# Patient Record
Sex: FEMALE | Race: WHITE
[De-identification: names, ages, dates, MRNs, and addresses within clinical notes are randomized per-mention and may not be internally consistent; named-entity substitution may affect disease eponyms.]

---

## 2019-09-06 ENCOUNTER — HOSPITAL ENCOUNTER (OUTPATIENT)
Dept: HOSPITAL 46 - DS | Age: 79
Discharge: HOME | End: 2019-09-06
Attending: SURGERY
Payer: MEDICARE

## 2019-09-06 VITALS — HEIGHT: 66 IN | BODY MASS INDEX: 27.48 KG/M2 | WEIGHT: 170.99 LBS

## 2019-09-06 DIAGNOSIS — Z79.899: ICD-10-CM

## 2019-09-06 DIAGNOSIS — F03.90: ICD-10-CM

## 2019-09-06 DIAGNOSIS — F32.9: ICD-10-CM

## 2019-09-06 DIAGNOSIS — Z79.01: ICD-10-CM

## 2019-09-06 DIAGNOSIS — E78.5: ICD-10-CM

## 2019-09-06 DIAGNOSIS — I10: ICD-10-CM

## 2019-09-06 DIAGNOSIS — D12.6: ICD-10-CM

## 2019-09-06 DIAGNOSIS — K64.8: ICD-10-CM

## 2019-09-06 DIAGNOSIS — I63.9: ICD-10-CM

## 2019-09-06 DIAGNOSIS — K64.4: ICD-10-CM

## 2019-09-06 DIAGNOSIS — K52.9: ICD-10-CM

## 2019-09-06 DIAGNOSIS — R06.02: ICD-10-CM

## 2019-09-06 DIAGNOSIS — F41.9: ICD-10-CM

## 2019-09-06 DIAGNOSIS — Z98.890: ICD-10-CM

## 2019-09-06 DIAGNOSIS — D12.3: Primary | ICD-10-CM

## 2019-09-06 PROCEDURE — 3E0H8GC INTRODUCTION OF OTHER THERAPEUTIC SUBSTANCE INTO LOWER GI, VIA NATURAL OR ARTIFICIAL OPENING ENDOSCOPIC: ICD-10-PCS | Performed by: SURGERY

## 2019-09-06 PROCEDURE — 0DBL8ZZ EXCISION OF TRANSVERSE COLON, VIA NATURAL OR ARTIFICIAL OPENING ENDOSCOPIC: ICD-10-PCS | Performed by: SURGERY

## 2019-09-06 PROCEDURE — 0DBE8ZZ EXCISION OF LARGE INTESTINE, VIA NATURAL OR ARTIFICIAL OPENING ENDOSCOPIC: ICD-10-PCS | Performed by: SURGERY

## 2019-09-06 NOTE — NUR
09/06/19 0809 Yudi Rodriguez
0802- PT ARRIVES TO PACU NONAROUSABLE TO NOXIOUS STIMULI. RESP EVEN
AND UNLABORED. OXYGEN SAT HIGH 90'S % ON 6L VIA MASK.

## 2019-09-08 NOTE — OR
[facility]
                                    [address], [place]  [address]
_________________________________________________________________________________________
                                                                 Signed   
 
 
DATE OF OPERATION:
09/06/2019
 
SURGEON:
José Antonio Sosa MD
 
PREOPERATIVE DIAGNOSES:
1. Chronic diarrhea.
2. Son with colon cancer in his 50s.
 
POSTOPERATIVE DIAGNOSES:
1. 7 mm sessile polyp in proximal transverse colon (tattoo).
2. 5 mm polyp at 45 and 15 cm.
3. Minimal-to-moderate internal and external hemorrhoids.
 
PROCEDURE:
Colonoscopy with hot biopsy and random cold biopsies as well as injection of tattoo.
 
ESTIMATED BLOOD LOSS:
None.
 
INDICATIONS:
Johnna is a 79-year-old female who had a previous stroke.  She has had trouble with
chronic diarrhea.  However, seems worse recently.  Consequently, this represents a
change in bowel habits.  She has been trying to use loperamide to control the diarrhea.
She had been asked to see me for colonoscopy with biopsies.  She thinks maybe she had a
colonoscopy many years ago.  She also explained that her son had colon cancer in his
50s.  He seems to be doing fine now.  She had come to the office with her caregiver.  I
gave them a pamphlet on colonoscopy.  We looked at that together along with the risks
including, but not limited to gas, bloating, crampy abdominal pain, bleeding,
perforation requiring surgery, and missed diagnosis.  We also discussed the need for IV
conscious sedation.  Given Johnna's advanced age and medical issues, we asked that an
anesthesia provider help us with increased monitoring and sedation with propofol.  They
had expressed understanding and wished to proceed. 
 
PROCEDURE NOTE:
Johnna was taken into our endoscopy suite and placed in the left lateral decubitus
position.  She was given IV sedation with propofol per our nurse anesthetist.  A digital
rectal exam was performed and she does have minimal-to-moderate external hemorrhoids.
The adult colonoscope was introduced and advanced all around into the cecum under direct
visualization of camera without difficulty.  Her prep was quite good.  We could easily
see the appendiceal orifice and the ileocecal valve.  We had taken pictures throughout
 
    Electronically Signed By: JOSÉ ANTONIO SOSA MD  09/08/19 1032
_________________________________________________________________________________________
PATIENT NAME:     JOHNNA JAEGER                      
MEDICAL RECORD #: [id number]            OPERATIVE REPORT              
          ACCT #: [id number]  
DATE OF BIRTH:   [birth year]            REPORT #: [id number]      
PHYSICIAN:        JOSÉ ANTONIO SOSA MD             
PCP:              TARIQ ADRIAN MD      
REPORT IS CONFIDENTIAL AND NOT TO BE RELEASED WITHOUT AUTHORIZATION
 
 
                                  [facility]
                                    [address] [place] [address]  [address]
_________________________________________________________________________________________
                                                                 Signed   
 
 
for photodocumentation.  The scope was slowly withdrawn.  We made several attempts to
pass the scope into the terminal ileum without success.  We had taken several biopsies
throughout the colon for pathologic review.  The above-mentioned polyps were easily
removed with the help of hot biopsy forceps.  We did inject a small tattoo next to the
polypectomy site in the proximal transverse colon.  We saw no diverticula.  No
inflammatory changes throughout the colon or rectum.  Upon retroflexion of the scope,
there were minimal-to-moderate internal hemorrhoids as well.  After this, the gas was
suctioned out and the colonoscope removed.  Johnna tolerated the procedure quite well. 
 
RECOMMENDATIONS:
I will see Johnna back in my office in 7 to 14 days to review her results.  She can resume
her Coumadin in 1 week.  She should not take any aspirin or NSAIDs for one week.  Her
other chronic medications, she can resume today. 
 
 
 
            ________________________________________
            José Antonio Sosa MD 
 
 
ALB/MODL
Job #:  [id number]
DD:  09/06/2019 08:14:28
DT:  09/06/2019 09:44:40
 
cc:            MD José Antonio Navarrete MD Daniel C Hambleton, MD
 
 
Copies:  MARLYS GALLARDO MD,HERBERTH SOSA,JA GONZALEZ MD, MD
~
 
 
 
 
    Electronically Signed By: JOSÉ ANTONIO SOSA MD  09/08/19 1032
_________________________________________________________________________________________
PATIENT NAME:     JOHNNA JAEGER                      
MEDICAL RECORD #: [id number]            OPERATIVE REPORT              
          ACCT #: [id number]  
DATE OF BIRTH:   [birth year]            REPORT #: [id number]      
PHYSICIAN:        JOSÉ ANTONIO SOSA MD             
PCP:              TARIQ ADRIAN MD      
REPORT IS CONFIDENTIAL AND NOT TO BE RELEASED WITHOUT AUTHORIZATION

## 2019-09-09 NOTE — PATH
[facility]
                                    [address] [place], [place]  [address]
_________________________________________________________________________________________
                                                                 Signed   
 
 
 
SPECIMEN(S): A COLON POLYP AT 45 CM
SPECIMEN(S): B RANDOM COLON BIOPSY
SPECIMEN(S): C PROXIMAL TRANSVERSE COLON BIOPSY
SPECIMEN(S): D COLON POLYP AT 15 CM
 
SPECIMEN SOURCE:
A. COLON POLYP AT 45 CM
B. RANDOM COLON BIOPSY
C. PROXIMAL TRANSVERSE COLON BIOPSY
D. COLON POLYP AT 15 CM
 
CLINICAL HISTORY:
Hx diarrhea.
MICROSCOPIC DESCRIPTION:
A, C, D.  Histologic sections of all submitted blocks are examined by light 
microscopy. These findings, together with the gross examination, support the 
pathologic diagnosis. 
B.  Sections reveal a biopsy of colonic mucosa.  The epithelial surface is 
intact but has decreased mucus secreting ability.  The basement membrane is not 
thickened.  The glands are simple and tubular 
and reach all of the way to the muscularis mucosae.  The lamina propria is 
minimally expanded by a population of plasma cells, lymphocytes, eosinophils 
and small numbers of neutrophils.  One of the 
neutrophils extend into the mucosa proper.  In a couple of areas there are 
excess intraepithelial lymphocytes.  No ulceration, crypt abscesses, areas of 
fibrosis, granulomas or pseudomembranes are 
seen.
LJA:cml
 
FINAL PATHOLOGIC DIAGNOSIS:
A.  Mucosa, colon at 45 cm, biopsy:
-  Tubular adenoma.
B.  Mucosa, colon, random biopsies:
-  Mild active chronic colitis.  (see comment)
C.  Mucosa, proximal transverse colon, biopsy:
-  Tubular adenoma with inflammatory features similar to that described above 
in B. 
D.  Mucosa, colon at 15 cm, biopsy:
-  Tubular adenoma.
 
COMMENT:
 
                                                                                    
_________________________________________________________________________________________
PATIENT NAME:     JOHNNA JAEGER                      
MEDICAL RECORD #: [id number]            PATHOLOGY                     
          ACCT #: [id number]       ACCESSION #: [id number]     
DATE OF BIRTH:   [birth year]            REPORT #: [id number]       
PHYSICIAN:        SHAISTA CHEUNG              
PCP:              TARIQ ADRIAN MD      
REPORT IS CONFIDENTIAL AND NOT TO BE RELEASED WITHOUT AUTHORIZATION
 
 
                                  [facility]
                                    [address] [place], [place]  [address]
_________________________________________________________________________________________
                                                                 Signed   
 
 
B -- The differential diagnosis includes both infectious and noninfectious 
causes of colitis, including microscopic colitis.  Features are not consistent 
with Crohn's colitis or ulcerative colitis. 
Clinical correlation required.
LJA:cml:C2NR
 
GROSS DESCRIPTION:
Four specimens are received in four containers, labeled "GALILEA."
A. The specimen, labeled "GALILEA, colon polyp at 45 cm," is received in formalin 
and consists of two, 0.3 and 0.4 cm tan fragments. Specimen is entirely 
submitted in cassette (A1). 
B. The specimen, labeled "GALILEA, random colon biopsy," is received in formalin 
and consists of four, 0.1-0.2 cm tan fragments. Specimen is entirely submitted 
in cassette (B1). 
C.  The specimen, labeled "GALILEA, proximal transverse colon biopsy," is received 
in formalin and consists of five, 0.2-0.3 cm tan-brown fragments. Specimen is 
entirely submitted in cassette (C1). 
D.  The specimen, labeled "GALILEA, colon polyp at 15 cm," is received in formalin 
and consists of a single 0.3 cm tan fragment. Specimen is entirely submitted in 
cassette (D1). 
AM (under the direct supervision of a pathologist)
 
The Gross Description was prepared using a voice recognition system.  The 
report was reviewed for accuracy; however, sound-alike word errors, addition 
and/or deletions may occur.  If there is any 
question about this report, please contact Client Services.
 
PERFORMING LABORATORY:
The technical component was performed by [facility], [address] [place] [id number] (Medical Director: Janine Mojica MD; CLIA# [id number]). 
Professional interpretation was performed by 
[facility], [address] [address] (Medical Director:  Jaydon Warner MD; CLIA# 
[id number]). 
 
Diagnostician:  Jaydon Warner MD
Pathologist
Electronically Signed 09/09/2019
 
 
Copies:                                
 
 
                                                                                    
_________________________________________________________________________________________
PATIENT NAME:     JOHNNA JAEGER                      
MEDICAL RECORD #: [id number]            PATHOLOGY                     
          ACCT #: [id number]       ACCESSION #: [id number]     
DATE OF BIRTH:   [birth year]            REPORT #: [id number]       
PHYSICIAN:        SHAISTA PATHOLOGY              
PCP:              TARIQ ADRIAN MD      
REPORT IS CONFIDENTIAL AND NOT TO BE RELEASED WITHOUT AUTHORIZATION
 
 
                                  [facility]
                                    [address] [place], [place]  [address]
_________________________________________________________________________________________
                                                                 Signed   
 
 
~
 
 
 
 
 
 
 
 
 
 
 
 
 
 
 
 
 
 
 
 
 
 
 
 
 
 
 
 
 
 
 
 
 
 
 
 
 
 
 
 
 
 
                                                                                    
_________________________________________________________________________________________
PATIENT NAME:     JOHNNA JAEGER                      
MEDICAL RECORD #: [id number]            PATHOLOGY                     
          ACCT #: [id number]       ACCESSION #: [id number]     
DATE OF BIRTH:   [birth year]            REPORT #: [id number]       
PHYSICIAN:        SAHISTA PATHOLOGY              
PCP:              TARIQ ADRIAN MD      
REPORT IS CONFIDENTIAL AND NOT TO BE RELEASED WITHOUT AUTHORIZATION